# Patient Record
Sex: FEMALE | Race: WHITE | NOT HISPANIC OR LATINO | Employment: FULL TIME | URBAN - METROPOLITAN AREA
[De-identification: names, ages, dates, MRNs, and addresses within clinical notes are randomized per-mention and may not be internally consistent; named-entity substitution may affect disease eponyms.]

---

## 2017-06-22 ENCOUNTER — OFFICE VISIT (OUTPATIENT)
Dept: PODIATRY | Facility: CLINIC | Age: 32
End: 2017-06-22
Payer: COMMERCIAL

## 2017-06-22 ENCOUNTER — HOSPITAL ENCOUNTER (OUTPATIENT)
Dept: RADIOLOGY | Facility: HOSPITAL | Age: 32
Discharge: HOME | End: 2017-06-22
Attending: PODIATRIST
Payer: COMMERCIAL

## 2017-06-22 VITALS
SYSTOLIC BLOOD PRESSURE: 120 MMHG | WEIGHT: 211 LBS | HEIGHT: 62 IN | HEART RATE: 75 BPM | DIASTOLIC BLOOD PRESSURE: 64 MMHG | BODY MASS INDEX: 38.83 KG/M2

## 2017-06-22 DIAGNOSIS — M72.2 PLANTAR FASCIITIS, LEFT: ICD-10-CM

## 2017-06-22 DIAGNOSIS — G89.29 CHRONIC HEEL PAIN, LEFT: ICD-10-CM

## 2017-06-22 DIAGNOSIS — M79.672 CHRONIC HEEL PAIN, LEFT: ICD-10-CM

## 2017-06-22 DIAGNOSIS — M72.2 PLANTAR FASCIITIS, LEFT: Primary | ICD-10-CM

## 2017-06-22 DIAGNOSIS — M62.462 GASTROCNEMIUS EQUINUS OF LEFT LOWER EXTREMITY: ICD-10-CM

## 2017-06-22 PROCEDURE — 73650 X-RAY EXAM OF HEEL: CPT | Mod: TC,PO,LT

## 2017-06-22 PROCEDURE — 73650 X-RAY EXAM OF HEEL: CPT | Mod: 26,LT,, | Performed by: RADIOLOGY

## 2017-06-22 PROCEDURE — 99999 PR PBB SHADOW E&M-EST. PATIENT-LVL III: CPT | Mod: PBBFAC,,, | Performed by: PODIATRIST

## 2017-06-22 PROCEDURE — 99203 OFFICE O/P NEW LOW 30 MIN: CPT | Mod: S$GLB,,, | Performed by: PODIATRIST

## 2017-06-22 RX ORDER — METHYLPREDNISOLONE 4 MG/1
TABLET ORAL
Qty: 1 PACKAGE | Refills: 0 | Status: SHIPPED | OUTPATIENT
Start: 2017-06-22 | End: 2017-07-07 | Stop reason: ALTCHOICE

## 2017-06-22 RX ORDER — HYDROXYZINE PAMOATE 25 MG/1
CAPSULE ORAL
Refills: 0 | COMMUNITY
Start: 2017-05-19 | End: 2018-01-16

## 2017-06-22 RX ORDER — AMOXICILLIN 500 MG
2 CAPSULE ORAL DAILY
COMMUNITY
End: 2019-02-09

## 2017-06-22 RX ORDER — CITALOPRAM 20 MG/1
TABLET, FILM COATED ORAL
Refills: 0 | COMMUNITY
Start: 2017-05-19 | End: 2019-02-09

## 2017-06-22 NOTE — PATIENT INSTRUCTIONS
Byron Villanueva    Recommended Sof Sol Arch Supports for plantar fasciitis found at [employer]      Bent-Knee Calf Stretch  This exercise is designed to stretch and strengthen your feet and ankles. Before beginning the exercise, read through all the instructions. While exercising, breathe normally and dont bounce. If you feel any pain, stop the exercise. If pain persists, inform your healthcare provider:    · Stand an arms length away from a wall. Place the palms of your hands on the wall. Step forward about 12 inches with your ______ foot.  · Keeping toes pointed forward and both heels on the floor, bend both knees and lean forward. Hold for 30______ seconds. Relax.  · Repeat _____3_ times. Do __2-3____ sets a day.  Date Last Reviewed: 8/16/2015  © 2184-9610 [employer]. [address] [place] [address], [place] [address]. All rights reserved. This information is not intended as a substitute for professional medical care. Always follow your healthcare professional's instructions.

## 2017-06-23 NOTE — PROGRESS NOTES
"Subjective:      Patient ID: Meche Yoo is a 31 y.o. female.    Chief Complaint: Heel Spurs (Left Foot); Foot Pain (Left Foot); and Heel Pain (Left Foot)    Meche is a 31 y.o. female who presents to the clinic complaining of heel pain in the left foot, especially with the first step in the morning. The pain is described as Aching. The onset of the pain was gradual and has worsened over the past several months. Meche rates the pain as 6/10. She denies a history of trauma. Prior treatments include none.    Vitals:    17 1429   BP: 120/64   Pulse: 75   Weight: 95.7 kg (211 lb)   Height: 5' 2" (1.575 m)   PainSc:   8   PainLoc: Foot      Past Medical History:   Diagnosis Date    Retinal vascular occlusion     Patient reports getting dx'ed with "blood clot behind eye" while on OCPs       Past Surgical History:   Procedure Laterality Date     SECTION      x3    DILATION AND CURETTAGE OF UTERUS      TUBAL LIGATION      at time of last CD       Family History   Problem Relation Age of Onset    Breast cancer Neg Hx     Colon cancer Neg Hx     Ovarian cancer Neg Hx        Social History     Social History    Marital status:      Spouse name: N/A    Number of children: N/A    Years of education: N/A     Social History Main Topics    Smoking status: Never Smoker    Smokeless tobacco: Never Used    Alcohol use No    Drug use: No    Sexual activity: Yes     Partners: Male     Birth control/ protection: None      Comment:      Other Topics Concern    None     Social History Narrative    None       Current Outpatient Prescriptions   Medication Sig Dispense Refill    citalopram (CELEXA) 20 MG tablet TK 1 T PO QD FOR ANXIETY OR DEPRESSION  0    fish oil-omega-3 fatty acids 300-1,000 mg capsule Take 2 g by mouth once daily.      hydrOXYzine pamoate (VISTARIL) 25 MG Cap TK 1 C PO 1 TIME HS PRA OR INSOMNIA  0    methylPREDNISolone (MEDROL DOSEPACK) 4 mg tablet use as directed " 1 Package 0     No current facility-administered medications for this visit.        Review of patient's allergies indicates:  No Known Allergies      Review of Systems   Constitution: Negative for chills, fever, weakness and malaise/fatigue.   Cardiovascular: Negative for chest pain, claudication and leg swelling.   Respiratory: Negative for cough and shortness of breath.    Skin: Negative for color change, itching and poor wound healing.   Musculoskeletal: Negative for back pain, joint pain, muscle cramps and muscle weakness.   Gastrointestinal: Negative for nausea and vomiting.   Neurological: Negative for numbness and paresthesias.   Psychiatric/Behavioral: Negative for altered mental status.           Objective:      Physical Exam   Constitutional: She is oriented to person, place, and time. She appears well-developed and well-nourished. No distress.   Cardiovascular:   Pulses:       Dorsalis pedis pulses are 2+ on the right side, and 2+ on the left side.        Posterior tibial pulses are 2+ on the right side, and 2+ on the left side.   CFT< 3 secs all toes bilateral foot, skin temp warm bilateral foot, diminished digital hair growth bilateral foot, no lower extremity edema bilateral.     Musculoskeletal:        Feet:    Supinated foot type bilateral.    + equinus that reduces with knee bent bilateral.       Neurological: She is alert and oriented to person, place, and time. She has normal strength. No sensory deficit. Gait normal.   Skin: Skin is warm, dry and intact. Capillary refill takes less than 2 seconds. No ecchymosis and no rash noted. She is not diaphoretic. No cyanosis or erythema. Nails show no clubbing.   No open lesions or macerations bilateral lower extremity.               Assessment:       Encounter Diagnoses   Name Primary?    Plantar fasciitis, left Yes    Chronic heel pain, left     Gastrocnemius equinus of left lower extremity          Plan:       Meche was seen today for heel spurs,  "foot pain and heel pain.    Diagnoses and all orders for this visit:    Plantar fasciitis, left  -     X-Ray Calcaneus 2 View Left; Future    Chronic heel pain, left  -     X-Ray Calcaneus 2 View Left; Future    Gastrocnemius equinus of left lower extremity    Other orders  -     methylPREDNISolone (MEDROL DOSEPACK) 4 mg tablet; use as directed      I counseled the patient on her conditions, their implications and medical management.    Dispensed literature on and demonstrated calf muscle stretches. Reviewed appropriate shoe gear and discussed activity modification such as avoiding flat shoes and barefoot walking. Recommend 1/2-1" heel height.    Recommended Sof Sol Fit Series Arch Supports with neutral arch found on [employer] or [employer] at [facility].    Recommended [employer] Ghost tennis shoes.    RTC 2-3 weeks or prn.    .         "

## 2017-07-07 ENCOUNTER — OFFICE VISIT (OUTPATIENT)
Dept: PODIATRY | Facility: CLINIC | Age: 32
End: 2017-07-07
Payer: COMMERCIAL

## 2017-07-07 VITALS
SYSTOLIC BLOOD PRESSURE: 102 MMHG | WEIGHT: 211 LBS | BODY MASS INDEX: 38.83 KG/M2 | DIASTOLIC BLOOD PRESSURE: 65 MMHG | HEIGHT: 62 IN | HEART RATE: 83 BPM

## 2017-07-07 DIAGNOSIS — M72.2 PLANTAR FASCIITIS, LEFT: Primary | ICD-10-CM

## 2017-07-07 DIAGNOSIS — M79.672 PAIN OF LEFT HEEL: ICD-10-CM

## 2017-07-07 PROCEDURE — 99999 PR PBB SHADOW E&M-EST. PATIENT-LVL III: CPT | Mod: PBBFAC,,, | Performed by: PODIATRIST

## 2017-07-07 PROCEDURE — 99212 OFFICE O/P EST SF 10 MIN: CPT | Mod: S$GLB,,, | Performed by: PODIATRIST

## 2017-07-07 NOTE — PATIENT INSTRUCTIONS
Recommended Sof Sol Fit Series Arch Supports with neutral arch found on [employer] or [employer] at [facility].

## 2017-07-11 NOTE — PROGRESS NOTES
"Subjective:      Patient ID: Meche Yoo is a 31 y.o. female.    Chief Complaint: Foot Pain (2 wk f/u ) and Results (Xray Results)    Meche is a 31 y.o. female who presents to the clinic complaining of heel pain in the left foot, especially with the first step in the morning. The pain is described as Aching. The onset of the pain was gradual and has worsened over the past several months. Meche rates the pain as 6/10. She denies a history of trauma. Prior treatments include none.    17: Follow up for left heel pain. Relates pain has improved since last visit and it does  Not reach a 5/10 until towards the end of the day. Wearing tennis shoes and arch supports. States she is stretching. She had no pain while on medrol dose musa but pain has returned somewhat but to lesser extent.    Vitals:    17 1549   BP: 102/65   Pulse: 83   Weight: 95.7 kg (211 lb)   Height: 5' 2" (1.575 m)   PainSc:   5   PainLoc: Foot      Past Medical History:   Diagnosis Date    Retinal vascular occlusion     Patient reports getting dx'ed with "blood clot behind eye" while on OCPs       Past Surgical History:   Procedure Laterality Date     SECTION      x3    DILATION AND CURETTAGE OF UTERUS      TUBAL LIGATION      at time of last CD       Family History   Problem Relation Age of Onset    Breast cancer Neg Hx     Colon cancer Neg Hx     Ovarian cancer Neg Hx        Social History     Social History    Marital status:      Spouse name: N/A    Number of children: N/A    Years of education: N/A     Social History Main Topics    Smoking status: Never Smoker    Smokeless tobacco: Never Used    Alcohol use No    Drug use: No    Sexual activity: Yes     Partners: Male     Birth control/ protection: None      Comment:      Other Topics Concern    None     Social History Narrative    None       Current Outpatient Prescriptions   Medication Sig Dispense Refill    citalopram (CELEXA) 20 MG " tablet TK 1 T PO QD FOR ANXIETY OR DEPRESSION  0    fish oil-omega-3 fatty acids 300-1,000 mg capsule Take 2 g by mouth once daily.      hydrOXYzine pamoate (VISTARIL) 25 MG Cap TK 1 C PO 1 TIME HS PRA OR INSOMNIA  0     No current facility-administered medications for this visit.        Review of patient's allergies indicates:  No Known Allergies      Review of Systems   Constitution: Negative for chills, fever, weakness and malaise/fatigue.   Cardiovascular: Negative for chest pain, claudication and leg swelling.   Respiratory: Negative for cough and shortness of breath.    Skin: Negative for color change, itching and poor wound healing.   Musculoskeletal: Negative for back pain, joint pain, muscle cramps and muscle weakness.   Gastrointestinal: Negative for nausea and vomiting.   Neurological: Negative for numbness and paresthesias.   Psychiatric/Behavioral: Negative for altered mental status.           Objective:      Physical Exam   Constitutional: She is oriented to person, place, and time. She appears well-developed and well-nourished. No distress.   Cardiovascular:   Pulses:       Dorsalis pedis pulses are 2+ on the right side, and 2+ on the left side.        Posterior tibial pulses are 2+ on the right side, and 2+ on the left side.   CFT< 3 secs all toes bilateral foot, skin temp warm bilateral foot, diminished digital hair growth bilateral foot, no lower extremity edema bilateral.     Musculoskeletal:        Feet:    Supinated foot type bilateral.    + equinus that reduces with knee bent bilateral.       Neurological: She is alert and oriented to person, place, and time. She has normal strength. No sensory deficit. Gait normal.   Skin: Skin is warm, dry and intact. Capillary refill takes less than 2 seconds. No ecchymosis and no rash noted. She is not diaphoretic. No cyanosis or erythema. Nails show no clubbing.   No open lesions or macerations bilateral lower extremity.               Assessment:        Encounter Diagnoses   Name Primary?    Plantar fasciitis, left Yes    Pain of left heel          Plan:       Meche was seen today for foot pain and results.    Diagnoses and all orders for this visit:    Plantar fasciitis, left    Pain of left heel      I counseled the patient on her conditions, their implications and medical management.    Refused steroid injection.    Refused PT.    Continue activity and shoe gear modifications.    Continue stretching.    RTC prn if pain does not continue to improve and resolve within 4-8 weeks as discussed  .

## 2018-01-16 ENCOUNTER — HOSPITAL ENCOUNTER (EMERGENCY)
Facility: HOSPITAL | Age: 33
Discharge: HOME | End: 2018-01-16
Attending: EMERGENCY MEDICINE
Payer: COMMERCIAL

## 2018-01-16 VITALS
BODY MASS INDEX: 43.98 KG/M2 | HEIGHT: 60 IN | TEMPERATURE: 98 F | RESPIRATION RATE: 20 BRPM | SYSTOLIC BLOOD PRESSURE: 135 MMHG | HEART RATE: 70 BPM | OXYGEN SATURATION: 98 % | WEIGHT: 224 LBS | DIASTOLIC BLOOD PRESSURE: 80 MMHG

## 2018-01-16 DIAGNOSIS — J02.0 STREP PHARYNGITIS: Primary | ICD-10-CM

## 2018-01-16 LAB
B-HCG UR QL: NEGATIVE
CTP QC/QA: YES
DEPRECATED S PYO AG THROAT QL EIA: POSITIVE

## 2018-01-16 PROCEDURE — 96372 THER/PROPH/DIAG INJ SC/IM: CPT

## 2018-01-16 PROCEDURE — 87880 STREP A ASSAY W/OPTIC: CPT

## 2018-01-16 PROCEDURE — 99283 EMERGENCY DEPT VISIT LOW MDM: CPT | Mod: 25

## 2018-01-16 PROCEDURE — 63600175 PHARM REV CODE 636 W HCPCS: Mod: JG | Performed by: EMERGENCY MEDICINE

## 2018-01-16 RX ORDER — LIDOCAINE HYDROCHLORIDE 20 MG/ML
SOLUTION OROPHARYNGEAL
Qty: 100 ML | Refills: 0 | Status: SHIPPED | OUTPATIENT
Start: 2018-01-16 | End: 2019-02-09 | Stop reason: ALTCHOICE

## 2018-01-16 RX ADMIN — PENICILLIN G BENZATHINE 1.2 MILLION UNITS: 1200000 INJECTION, SUSPENSION INTRAMUSCULAR at 08:01

## 2018-01-16 NOTE — ED PROVIDER NOTES
"Encounter Date: 2018    SCRIBE #1 NOTE: I, Leslieian Garcia, am scribing for, and in the presence of, Dr. Mendenhall.       History     Chief Complaint   Patient presents with    Sore Throat     since Thursday     Time seen by provider: 0723    This is a 32 y.o. female who presents with complaint of sore throat onset 5 days ago. The patient associates ear drainage, painful swallowing. The patient denies vomiting, fever, ear pain. She says it feels like she has strep. The patient is able to swallow. The pt states she used chloraseptic at home with no improvement.       The history is provided by the patient.     Review of patient's allergies indicates:  No Known Allergies  Past Medical History:   Diagnosis Date    Retinal vascular occlusion     Patient reports getting dx'ed with "blood clot behind eye" while on OCPs     Past Surgical History:   Procedure Laterality Date     SECTION      x3    DILATION AND CURETTAGE OF UTERUS      TUBAL LIGATION      at time of last CD     Family History   Problem Relation Age of Onset    Breast cancer Neg Hx     Colon cancer Neg Hx     Ovarian cancer Neg Hx      Social History   Substance Use Topics    Smoking status: Never Smoker    Smokeless tobacco: Never Used    Alcohol use No     Review of Systems   Constitutional: Positive for appetite change. Negative for chills and fever.   HENT: Positive for sore throat. Negative for congestion, ear discharge, ear pain, sinus pain, sinus pressure and trouble swallowing.         Ear drainage  Painful swallowing     Eyes: Negative for redness.   Respiratory: Negative for cough.    Gastrointestinal: Negative for abdominal pain, nausea and vomiting.   Musculoskeletal: Negative for myalgias.   Skin: Negative for rash.   Allergic/Immunologic: Negative for immunocompromised state.   Neurological: Negative for dizziness and headaches.   Hematological: Negative for adenopathy.   All other systems reviewed and are " negative.      Physical Exam     Initial Vitals [01/16/18 0715]   BP Pulse Resp Temp SpO2   130/73 85 16 97.8 °F (36.6 °C) 99 %      MAP       92         Physical Exam    Nursing note and vitals reviewed.  Constitutional: She appears well-developed and well-nourished. She is not diaphoretic. No distress.   HENT:   Head: Normocephalic and atraumatic.   Right Ear: External ear normal.   Left Ear: External ear normal.   Nose: Nose normal.   Mouth/Throat: Uvula is midline and mucous membranes are normal. Posterior oropharyngeal erythema present. No oropharyngeal exudate, posterior oropharyngeal edema or tonsillar abscesses.   Erythema to oropharynx, MM moist   Eyes: Conjunctivae and EOM are normal.   Neck: Normal range of motion. Neck supple.   Cardiovascular: Normal rate, regular rhythm, normal heart sounds and intact distal pulses. Exam reveals no gallop and no friction rub.    No murmur heard.  Pulmonary/Chest: Breath sounds normal. She has no wheezes. She has no rhonchi. She has no rales.   Abdominal: Soft. She exhibits no distension. There is no tenderness.   Musculoskeletal: Normal range of motion.   Lymphadenopathy:     She has no cervical adenopathy.   Neurological: She is alert and oriented to person, place, and time.   Skin: Skin is warm and dry. No rash noted.   Psychiatric: She has a normal mood and affect. Her behavior is normal.         ED Course   Procedures  Labs Reviewed - No data to display          Medical Decision Making:   Initial Assessment:   This is a 32 year old female who presents for sore throat onset Thursday. Order rapid strep screen.  Differential Diagnosis:   Strep, viral pharyngitis, PTA  Clinical Tests:   Lab Tests: Ordered and Reviewed       <> Summary of Lab: Strep +  ED Management:  0825 Pt's strep test was positive.  Pt treated with IM bicillin.  I will discharge with Lidocaine for pain.  Pt instructed to take motrin as needed    Pt counseled on their diagnosis and the importance of  following up with PCP.  Pt also cautioned on when to return to ED.  Pt verbalizes understanding of discharge plan and will return to ED immediately if symptoms worsen                    ED Course      Clinical Impression:     1. Strep pharyngitis         Disposition:   Disposition: Discharged  Condition: Stable           Scribe attestation: I, Dr. Lyric Mendenhall, personally performed the services described in this documentation. All medical record entries made by the scribe were at my direction and in my presence.  I have reviewed the chart and agree that the record reflects my personal performance and is accurate and complete. Lyric Mendenhall MD.  9:35 AM 01/16/2018                 Lyric Mendenhall MD  01/16/18 0935

## 2020-04-21 DIAGNOSIS — Z01.84 ANTIBODY RESPONSE EXAMINATION: ICD-10-CM

## 2020-05-21 DIAGNOSIS — Z01.84 ANTIBODY RESPONSE EXAMINATION: ICD-10-CM

## 2020-06-20 DIAGNOSIS — Z01.84 ANTIBODY RESPONSE EXAMINATION: ICD-10-CM

## 2020-07-20 DIAGNOSIS — Z01.84 ANTIBODY RESPONSE EXAMINATION: ICD-10-CM

## 2020-08-19 DIAGNOSIS — Z01.84 ANTIBODY RESPONSE EXAMINATION: ICD-10-CM

## 2020-09-18 DIAGNOSIS — Z01.84 ANTIBODY RESPONSE EXAMINATION: ICD-10-CM

## 2020-10-18 DIAGNOSIS — Z01.84 ANTIBODY RESPONSE EXAMINATION: ICD-10-CM

## 2020-11-17 DIAGNOSIS — Z01.84 ANTIBODY RESPONSE EXAMINATION: ICD-10-CM
